# Patient Record
Sex: FEMALE | Race: WHITE
[De-identification: names, ages, dates, MRNs, and addresses within clinical notes are randomized per-mention and may not be internally consistent; named-entity substitution may affect disease eponyms.]

---

## 2017-11-19 ENCOUNTER — HOSPITAL ENCOUNTER (EMERGENCY)
Dept: HOSPITAL 45 - C.EDB | Age: 52
Discharge: HOME | End: 2017-11-19
Payer: SELF-PAY

## 2017-11-19 VITALS
WEIGHT: 156.31 LBS | BODY MASS INDEX: 26.04 KG/M2 | HEIGHT: 65 IN | WEIGHT: 156.31 LBS | HEIGHT: 65 IN | BODY MASS INDEX: 26.04 KG/M2

## 2017-11-19 VITALS — DIASTOLIC BLOOD PRESSURE: 74 MMHG | OXYGEN SATURATION: 98 % | HEART RATE: 76 BPM | SYSTOLIC BLOOD PRESSURE: 155 MMHG

## 2017-11-19 VITALS — TEMPERATURE: 97.7 F

## 2017-11-19 DIAGNOSIS — M54.5: Primary | ICD-10-CM

## 2017-11-19 DIAGNOSIS — G89.29: ICD-10-CM

## 2017-11-19 DIAGNOSIS — J45.909: ICD-10-CM

## 2017-11-19 DIAGNOSIS — F17.210: ICD-10-CM

## 2017-11-19 NOTE — EMERGENCY ROOM VISIT NOTE
ED Visit Note


First contact with patient:  19:55


CHIEF COMPLAINT:  Low back pain





HISTORY OF PRESENT ILLNESS:  This 52-year-old female patient presents to the 

emergency department, ambulatory, complaining of pain in the low back and hip 

which is chronic.  The patient states she has been experiencing chronic left 

and right lower back pain radiating into both hips for at least 5-6 years.  She 

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  The patient states 

normally the pain is manageable with Advil and heat, but once or twice a year, 

she needs stronger medications.  She states proximally 2 months ago, she was 

seen here for worsening pain, which seemed to be associated with lifting wrong 

and working more.  She states recently, she is working more often, but does not 

recall any specific injury.  She states the back pain began flaring up over the 

past week.  She is having difficulty sleeping, and has been sleeping in a 

chair.  The patient states she is having pain next to the spine, but not in the 

spine, which radiates into the left hip from the left paraspinous muscles.  She 

describes the pain as achy, and worse than her normal pain, and does radiate 

into the buttock.  The patient states the pain is worse at rest and while in 

bed than it is during the day when she is moving.  The patient notes the pain 

as achy and a 8/10 now, but 9/10 while sleeping.  The patient has taken 600 mg 

Advil every 4-6 hours as well as Tylenol intermittently.  She states she 

normally tries to mix these medications so that her body does not use to one of 

them over the other.  She has not received significant relief of the pain.  The 

patient denies any loss of control of their bowel or bladder functions.  There 

has been no leg numbness or weakness, and no change in sensation.  No nausea or 

vomiting or abdominal pain.  No chest pain or shortness of breath.  The patient 

has not had specific prior back injuries, but states she simply has chronic low 

back pain.  No dysuria or increased urinary frequency.  The patient does not 

have a PCP due to being self-pay.





REVIEW OF SYSTEMS:   A review of systems was performed with positives and 

pertinent negatives listed in the history of present illness. All other systems 

were reviewed and are negative.





ALLERGIES: None





MEDICATIONS: None





PMH: Asthma, bronchitis





SOCIAL HISTORY: The patient lives locally with her family.  She cleans houses 

for living.  She denies drug, alcohol use.  The patient admits to smoking 

approximately one quarter pack of cigarettes per day.


  


PHYSICAL EXAM: 


VITALS: Vitals are noted on the nurse's note and reviewed by myself.  Vital 

signs stable.


GENERAL: This is a 52-year-old white female, in no acute distress, 

nondiaphoretic, well-developed well-nourished.


SKIN: The skin was without rashes, erythema, edema, or bruising.  Capillary 

refill less than 2 seconds.    


NECK: Supple without nuchal rigidity.  No cervical spine tenderness.  No 

paraspinous muscle tenderness.    


HEART: Regular rate and rhythm without murmurs gallops or rubs.


LUNGS: Clear to auscultation bilaterally without wheezes, rales or rhonchi.  


ABDOMEN: Positive bowel sounds x 4.  Normal tympanic percussion.  Soft, 

nontender, without masses or organomegaly.  Rhoades sign negative.


MUSCULOSKELETAL: No muscle atrophy, erythema, or edema noted of the back.  

There is no tenderness over the lumbar spinous processes.  There is moderate 

tenderness over the paraspinous muscles bilaterally.  There is no tenderness 

over the thoracic spine or paraspinous muscles.  There are moderate muscle 

spasms present.  The patient is slow to move around with maximum tenderness 

with position changes.  Negative straight leg raise test.


NEURO: Patient was alert and oriented to person place and time.  Normal 

sensation to light and sharp touch.  Deep tendon reflexes 2+ in the lower 

extremities.  Dorsalis pedis pulse 2+ bilaterally.  Strength 5/5 and equal in 

the bilateral lower extremities.





EMERGENCY DEPARTMENT COURSE: She was seen and evaluated as above.  She presents 

today with flareup of her chronic low back pain.  She does not have any recent 

injury or illness which could be causing her low back pain.  The patient states 

this is typical every few months where she needs stronger pain medication.  I 

discussed with the patient options including steroids, muscle relaxers, and 

advised her that I do not recommend narcotic pain medication for chronic pain.  

I offered the patient a Decadron injection, and she declines, so I discussed 

with her that I can give her a prescription for prednisone to be taken starting 

tomorrow.  The patient was given Flexeril to help with muscle spasms.  

Discharge instructions were reviewed, and she was encouraged to follow up 

outpatient with a PCP and look into physical therapy.  The patient was 

discharged home in good condition.





Blood Pressure Screening: Patient was found to have a slightly elevated blood 

pressure due to circumstances. I do not believe that the patient requires 

hypertension monitoring. 


I attest that I have personally reviewed the patient's current medication list. 








DIFFERENTIAL DIAGNOSIS: Low back strain, chronic low back pain, sciatica, cauda 

equina syndrome, malignancy, and others





DIAGNOSIS: Chronic low back pain


Problem List


Medical Problems:


(1) Asthma


Status: Chronic  





(2) Asthmatic bronchitis


Status: Resolved  











Current/Historical Medications


Scheduled


Prednisone (Prednisone), 0 PO DAILY





Scheduled PRN


Albuterol (Ventolin Hfa), 2 PUFFS INH Q4H PRN for SOB/Wheezing


Aspirin-Acetaminophen-Caffeine (Excedrin Extra Strength), 1 TAB PO Q6H PRN for 

Pain


Cyclobenzaprine Hcl (Flexeril), 10 MG PO TID PRN for Muscle Spasms


Ibuprofen Tab (Advil), 200-600 MG PO Q4H PRN for Pain





Allergies


Uncoded Allergies:  


     SEASONAL (Allergy, Intermediate, COUGH,PROBLEMS WITH EYES, 5/22/15)





Vital Signs











  Date Time  Temp Pulse Resp B/P (MAP) Pulse Ox O2 Delivery O2 Flow Rate FiO2


 


11/19/17 20:19  76 18 155/74 98   


 


11/19/17 19:46 36.5 74 18 156/96 98 Room Air  











Medications Administered











 Medications


  (Trade)  Dose


 Ordered  Sig/Parth


 Route  Start Time


 Stop Time Status Last Admin


Dose Admin


 


 Cyclobenzaprine


 HCl


  (FLEXERIL 10MG


 Home Pack)  1 homepack  UD  ONCE


 PO  11/19/17 20:15


 11/19/17 20:16 DC 11/19/17 20:17


1 HOMEPACK











Departure Information


Impression





 Primary Impression:  


 Chronic low back pain





Dispostion


Home / Self-Care





Condition


GOOD





Prescriptions





Cyclobenzaprine Hcl (FLEXERIL) 10 Mg Tab


10 MG PO TID Y for Muscle Spasms, #12 TAB


   Prov: Whitney Tabares PA-C         11/19/17 


Prednisone (Prednisone) 20 Mg Tab


0 PO DAILY, #18 TAB


   3 DAILY FOR 3 DAYS, THEN 2 DAILY FOR 3 DAYS, THEN 1 DAILY FOR 3 DAYS.


   Prov: Whitney Tabares PA-C         11/19/17





Referrals


No Doctor, Assigned (PCP)





Patient Instructions


ED Neck Back Pain General, Low Back Pain Self Care, [facility]





Additional Instructions





You have been treated in the Emergency Department for Back Pain. 





You have been prescribed Prednisone. This is a steroid which will help decrease 

your inflammation, redness, and itch. Take this medicine as prescribed. Take 

the ENTIRE 9 day course. It is best to take steroids early in the morning as PM 

dosing can affect your sleeping patterns.





You have been prescribed Flexeril (cyclobenzaprine) 1 tab orally, three times 

per day as needed for muscle spasms. Do NOT exceed 30 mg (3 tabs) per day. Take 

your first dose at bedtime as it can make you drowsy. Do not take this 

medication and drive. Always take all medications as prescribed.





For pain control, you can use the following over-the-counter medicines (if >13 yo):


Ibuprofen(Motrin, Advil) may be used for fever or pain.  Use 600mg every six 

hours as needed.  Take with food.  Avoid using more than 2400mg in a 24 hour 

period.  Do not use 2400mg per day for more than three consecutive days without 

physician direction.  Prolonged inappropriate use can lead to stomach upset or 

ulcers. 


(AND/OR)


Acetaminophen(Tylenol) may be used for fever or pain.  Use 1000mg every six 

hours as needed.  Avoid using more than 3000mg in a 24 hour period.  





If this is an acute injury, ice can be applied to the area of pain for the 

first 3 days to help decrease pain and inflammation. After the first 3 days, a 

heating pad can be used over the area for continued soothing relief.





You should schedule a follow-up appointment in 2-3 days with your Primary Care 

Provider for further evaluation and treatment of your back pain.





Return to the Emergency Department if your current symptoms worsen despite 

treatment course outlined above, or if you develop any of the following symptoms

: intractable pain despite aforementioned treatment course, loss of control of 

your bowel or bladder, numbness or tingling in your groin, or development of a 

fever.





Problem Qualifiers








 Primary Impression:  


 Chronic low back pain


 Back pain laterality:  left  Sciatica presence:  without sciatica  Qualified 

Codes:  M54.5 - Low back pain; G89.29 - Other chronic pain

## 2018-08-20 ENCOUNTER — HOSPITAL ENCOUNTER (EMERGENCY)
Dept: HOSPITAL 45 - C.EDB | Age: 53
Discharge: HOME | End: 2018-08-20
Payer: COMMERCIAL

## 2018-08-20 VITALS — OXYGEN SATURATION: 99 % | DIASTOLIC BLOOD PRESSURE: 84 MMHG | SYSTOLIC BLOOD PRESSURE: 129 MMHG | HEART RATE: 74 BPM

## 2018-08-20 VITALS — TEMPERATURE: 98.06 F

## 2018-08-20 VITALS
HEIGHT: 65.98 IN | WEIGHT: 175.93 LBS | WEIGHT: 175.93 LBS | HEIGHT: 65.98 IN | BODY MASS INDEX: 28.27 KG/M2 | BODY MASS INDEX: 28.27 KG/M2

## 2018-08-20 DIAGNOSIS — F17.200: ICD-10-CM

## 2018-08-20 DIAGNOSIS — M54.5: Primary | ICD-10-CM

## 2018-08-20 DIAGNOSIS — M65.312: ICD-10-CM

## 2018-08-20 DIAGNOSIS — G62.9: ICD-10-CM

## 2018-08-20 NOTE — EMERGENCY ROOM VISIT NOTE
History


First contact with patient:  09:50


Chief Complaint:  FACIAL PAIN/INJURY


Stated Complaint:  PAIN IN BACK,HIPS,THUMB ETC DUE TO FALL





History of Present Illness


The patient is a 53 year old female who presents to the Emergency Room with 

complaints of persistent low back pain and numbness in all her extremities 

since she fell on June 7.  She also admits to left thumb pain.  The patient is 

mainly here for pain control.  The patient was seen here initially and had 

multiple x-rays of her cervical spine, lumbar spine, pelvis, femur which were 

all negative.  She followed up with her family doctor in [place] and also with 

Dr. Julien.  She had MRIs of her cervical thoracic and lumbar spines.  

 did not feel that she required surgery.  He also felt that she would 

benefit from pain management and therefore she is waiting for a call for pain 

management at [facility].  The patient states that she has been 

taking ibuprofen without any relief of her pain.  The patient states that she 

had Percocet initially which did help with the pain.  The patient denies any 

loss of bowel or bladder control or any saddle anesthesia.





Review of Systems


10 system review was performed and was negative unless stated otherwise history 

of present illness.





Past Medical/Surgical History


Medical Problems:


(1) Asthma


(2) Asthmatic bronchitis


Surgical Problems:


(1) History of appendectomy








Family History





No significant family history





Social History


Smoking Status:  Current Every Day Smoker


Alcohol Use:  none


Drug Use:  none


Marital Status:  


Housing Status:  lives with friends


Occupation Status:  employed





Current/Historical Medications


Scheduled


Methylprednisolone (Medrol Dosepak), 0 PO DAILY





Scheduled PRN


Albuterol (Ventolin Hfa), 2 PUFFS INH Q4H PRN for SOB/Wheezing


Oxycodone/Acetaminophen 5MG/325MG (Percocet 5MG/325MG), 1-2 TABS PO Q6 PRN for 

Pain





Physical Exam


Vital Signs











  Date Time  Temp Pulse Resp B/P (MAP) Pulse Ox O2 Delivery O2 Flow Rate FiO2


 


8/20/18 09:42 36.7 84 16 122/86 99 Room Air  











Physical Exam


GENERAL: 53-year-old white female appears in no acute distress.


MENTAL Status: Alert and oriented 3.


NECK: Supple, no lymphadenopathy noted.  No carotid bruits noted.


LUNGS: Clear auscultation without wheezes rales or rhonchi.


CARDIAC: Regular rate and rhythm without murmur.  Pulses is full and equal 

throughout.


 LUMBAR SPINE: No gross bony abnormality noted. Patient is tender to palpation 

over the lower spinous processes.  She is tender to palpation over the 

paravertebral regions bilaterally.  The patient has limited range of motion in 

all directions secondary to pain.   Muscle strength is 5 out of 5 bilateral 

lower extremities and symmetrical. NEURO: Patient is able to heel and toe walk 

without difficulty. I lateral patellar and Achilles reflexes are 2+. Sensation 

is intact to pinprick bilateral lower extremities. Negative straight leg raise 

bilaterally.


LEFT THUMB: No gross bony deformity noted.  No erythema or edema noted.  The 

patient has snapping of the tendon with extension of the thumb.  She also has 

tenderness palpation over the thenar eminence.





Medical Decision & Procedures


ER Provider


Diagnostic Interpretation:


LEFT THUMB 3 VIEWS





CLINICAL HISTORY: Left thumb pain status post trauma     





COMPARISON: None.





DISCUSSION: No acute fractures or dislocations are visualized. There is


periarticular osteopenia at the level of the interphalangeal joint. There is


sclerosis involving the tuft of distal phalanx likely chronic. There are


osteoarthritic changes at the level the first carpal metacarpal joint. There is


a nonspecific lucency at the base of the distal phalanx, possibly representing a


degenerative subchondral cyst.





IMPRESSION: No acute fractures or dislocations identified.











Electronically signed by:  Winston Dillon M.D.


8/20/2018 10:54 AM





ED Course


The patient was evaluated.  I did offer the patient a steroid injection but she 

does not like needles and therefore she would rather do oral steroids.  X-ray 

of the left thumb was ordered interpreted by the radiologist as above without 

any acute findings.  I discussed with the patient the treatment plan and she 

was in agreement.





Medical Decision


The patient is already seeking care for her back pain and neuropathy in all her 

extremities.  She already had significant diagnostic workup.  She is awaiting 

to be seen by the pain clinic.  She is mainly here for pain control.  She drove 

herself to the emergency room therefore she cannot given any narcotics while in 

the ER.  The PD MP was accessed and she does not have any active narcotic 

scripts.  I do not feel that it is unreasonable to give the patient some 

narcotics until she is seen by pain management.





[place] Drug Monitoring Program


Search Results:  patient reviewed within database





Medication Reconcilliation


Current Medication List:  was personally reviewed by me





Blood Pressure Screening


Patient's blood pressure:  Normal blood pressure





Impression





 Primary Impression:  


 Lumbar back pain


 Additional Impression:  


 Trigger finger of thumb





Departure Information


Dispostion


Home / Self-Care





Condition


GOOD





Prescriptions





Oxycodone/Acetaminophen 5MG/325MG (PERCOCET 5MG/325MG)  Tab


1-2 TABS PO Q6 Y for Pain, #24 TAB


   For Initial Treatment


   Prov: Elizabeth South PA-C         8/20/18 


Methylprednisolone (MEDROL DOSEPAK) 4 Mg Andre


0 PO DAILY, #1 PKT


   Prov: Elizabeth South PA-C         8/20/18





Referrals


Kathya Burns PA-C (PCP)





Forms


HOME CARE DOCUMENTATION FORM,                                                 

               IMPORTANT VISIT INFORMATION





Patient Instructions


My [facility]





Additional Instructions





Keep scheduled appointment with pain management.  Ibuprofen 600 mg every 6 

hours with food for pain.  Take Medrol Dosepak as prescribed.  Take Percocet as 

needed for more severe pain.  Do not drive while taking the Percocet.  Follow-

up with Dr. Puentes for your thumb





Problem Qualifiers








 Additional Impression:  


 Trigger finger of thumb


 Laterality:  left  Qualified Codes:  M65.312 - Trigger thumb, left thumb

## 2018-08-20 NOTE — DIAGNOSTIC IMAGING REPORT
LEFT THUMB 3 VIEWS



CLINICAL HISTORY: Left thumb pain status post trauma     



COMPARISON: None.



DISCUSSION: No acute fractures or dislocations are visualized. There is

periarticular osteopenia at the level of the interphalangeal joint. There is

sclerosis involving the tuft of distal phalanx likely chronic. There are

osteoarthritic changes at the level the first carpal metacarpal joint. There is

a nonspecific lucency at the base of the distal phalanx, possibly representing a

degenerative subchondral cyst.



IMPRESSION: No acute fractures or dislocations identified.







Electronically signed by:  Winston Dillon M.D.

8/20/2018 10:54 AM



Dictated Date/Time:  8/20/2018 10:51 AM